# Patient Record
Sex: FEMALE | Race: WHITE | Employment: OTHER | URBAN - NONMETROPOLITAN AREA
[De-identification: names, ages, dates, MRNs, and addresses within clinical notes are randomized per-mention and may not be internally consistent; named-entity substitution may affect disease eponyms.]

---

## 2017-01-24 ENCOUNTER — TRANSFERRED RECORDS (OUTPATIENT)
Dept: HEALTH INFORMATION MANAGEMENT | Facility: OTHER | Age: 62
End: 2017-01-24

## 2018-01-05 ENCOUNTER — TRANSFERRED RECORDS (OUTPATIENT)
Dept: HEALTH INFORMATION MANAGEMENT | Facility: OTHER | Age: 63
End: 2018-01-05

## 2018-03-13 ENCOUNTER — TRANSFERRED RECORDS (OUTPATIENT)
Dept: HEALTH INFORMATION MANAGEMENT | Facility: OTHER | Age: 63
End: 2018-03-13

## 2018-05-31 ENCOUNTER — TRANSFERRED RECORDS (OUTPATIENT)
Dept: HEALTH INFORMATION MANAGEMENT | Facility: OTHER | Age: 63
End: 2018-05-31

## 2019-01-22 ENCOUNTER — OFFICE VISIT (OUTPATIENT)
Dept: FAMILY MEDICINE | Facility: OTHER | Age: 64
End: 2019-01-22
Attending: FAMILY MEDICINE
Payer: COMMERCIAL

## 2019-01-22 VITALS
RESPIRATION RATE: 20 BRPM | DIASTOLIC BLOOD PRESSURE: 101 MMHG | BODY MASS INDEX: 33.03 KG/M2 | SYSTOLIC BLOOD PRESSURE: 156 MMHG | WEIGHT: 186.4 LBS | HEART RATE: 80 BPM | HEIGHT: 63 IN | TEMPERATURE: 98 F

## 2019-01-22 DIAGNOSIS — G43.009 MIGRAINE WITHOUT AURA AND WITHOUT STATUS MIGRAINOSUS, NOT INTRACTABLE: ICD-10-CM

## 2019-01-22 DIAGNOSIS — Z12.31 ENCOUNTER FOR SCREENING MAMMOGRAM FOR BREAST CANCER: ICD-10-CM

## 2019-01-22 DIAGNOSIS — I10 HYPERTENSION, UNSPECIFIED TYPE: ICD-10-CM

## 2019-01-22 DIAGNOSIS — Z00.00 ROUTINE HISTORY AND PHYSICAL EXAMINATION OF ADULT: Primary | ICD-10-CM

## 2019-01-22 LAB
ANION GAP SERPL CALCULATED.3IONS-SCNC: 7 MMOL/L (ref 3–14)
BUN SERPL-MCNC: 11 MG/DL (ref 7–25)
CALCIUM SERPL-MCNC: 9.8 MG/DL (ref 8.6–10.3)
CHLORIDE SERPL-SCNC: 109 MMOL/L (ref 98–107)
CO2 SERPL-SCNC: 25 MMOL/L (ref 21–31)
CREAT SERPL-MCNC: 0.74 MG/DL (ref 0.6–1.2)
GFR SERPL CREATININE-BSD FRML MDRD: 79 ML/MIN/{1.73_M2}
GLUCOSE SERPL-MCNC: 90 MG/DL (ref 70–105)
POTASSIUM SERPL-SCNC: 3.9 MMOL/L (ref 3.5–5.1)
SODIUM SERPL-SCNC: 141 MMOL/L (ref 134–144)
TSH SERPL DL<=0.05 MIU/L-ACNC: 3.24 IU/ML (ref 0.34–5.6)
VIT B12 SERPL-MCNC: 470 PG/ML (ref 180–914)

## 2019-01-22 PROCEDURE — 82607 VITAMIN B-12: CPT | Performed by: FAMILY MEDICINE

## 2019-01-22 PROCEDURE — 99396 PREV VISIT EST AGE 40-64: CPT | Performed by: FAMILY MEDICINE

## 2019-01-22 PROCEDURE — 84443 ASSAY THYROID STIM HORMONE: CPT | Performed by: FAMILY MEDICINE

## 2019-01-22 PROCEDURE — 80048 BASIC METABOLIC PNL TOTAL CA: CPT | Performed by: FAMILY MEDICINE

## 2019-01-22 PROCEDURE — 36415 COLL VENOUS BLD VENIPUNCTURE: CPT | Performed by: FAMILY MEDICINE

## 2019-01-22 RX ORDER — SUMATRIPTAN 100 MG/1
100 TABLET, FILM COATED ORAL DAILY PRN
Qty: 9 TABLET | Refills: 11 | Status: SHIPPED | OUTPATIENT
Start: 2019-01-22 | End: 2020-02-06

## 2019-01-22 RX ORDER — TOPIRAMATE 25 MG/1
25 TABLET, FILM COATED ORAL 2 TIMES DAILY
Qty: 180 TABLET | Refills: 3 | Status: SHIPPED | OUTPATIENT
Start: 2019-01-22 | End: 2020-02-06

## 2019-01-22 RX ORDER — LISINOPRIL 10 MG/1
10 TABLET ORAL DAILY
COMMUNITY
Start: 2019-01-08 | End: 2019-01-22

## 2019-01-22 RX ORDER — ESTRADIOL 0.1 MG/G
2 CREAM VAGINAL PRN
Refills: 1 | COMMUNITY
Start: 2018-03-29

## 2019-01-22 RX ORDER — SUMATRIPTAN 100 MG/1
100 TABLET, FILM COATED ORAL DAILY PRN
Refills: 11 | COMMUNITY
Start: 2018-12-17 | End: 2019-01-22

## 2019-01-22 RX ORDER — BACLOFEN 20 MG
2 TABLET ORAL DAILY
COMMUNITY
Start: 2011-08-26

## 2019-01-22 RX ORDER — TOPIRAMATE 25 MG/1
25 TABLET, FILM COATED ORAL DAILY
COMMUNITY
Start: 2019-01-08 | End: 2019-01-22

## 2019-01-22 RX ORDER — LISINOPRIL 20 MG/1
20 TABLET ORAL DAILY
Qty: 90 TABLET | Refills: 3 | Status: SHIPPED | OUTPATIENT
Start: 2019-01-22 | End: 2019-08-08

## 2019-01-22 SDOH — HEALTH STABILITY: MENTAL HEALTH: HOW OFTEN DO YOU HAVE 6 OR MORE DRINKS ON ONE OCCASION?: NEVER

## 2019-01-22 SDOH — HEALTH STABILITY: MENTAL HEALTH: HOW OFTEN DO YOU HAVE A DRINK CONTAINING ALCOHOL?: NEVER

## 2019-01-22 ASSESSMENT — MIFFLIN-ST. JEOR: SCORE: 1361.69

## 2019-01-22 ASSESSMENT — PAIN SCALES - GENERAL: PAINLEVEL: MILD PAIN (3)

## 2019-01-22 NOTE — PROGRESS NOTES
Female Physical Note    Concerns today:   Nursing Notes:   Duglas Bernard LPN  2019  1:23 PM  Signed  Patient presents to clinic today for an annual physical.     No LMP recorded.  Medication Reconciliation: complete    Duglas Marco AntonioKILO  2019 12:54 PM    63-year-old female presents to [facility] care and for annual physical.  She is also here for follow-up of chronic medical problems.    Migraine headaches have been present for the most of her life.  She reports increasing frequency over the past few years.  Previously seen reports a CT scan was performed that was normal within the last 5 years.  She is tried a variety of medications for prevention.  Currently on Topamax 25 mg daily.  With higher doses she developed some twitching of her eyes.  She also did not tolerate beta-blockers.  Still having HAs, 1-2 per week  Migraine HA, on topamax for prevention, triggers gluten and chocolate, uses  Imitrex, did not tolerate higher dosage of topamax, also tried beta-blocker, not effective      Mid back pain, x 3-4 months, constant, NSAIDs helps some          ROS:  CONSTITUTIONAL: no fatigue, no unexpected change in weight  SKIN: no worrisome rashes, no worrisome moles, no worrisome lesions  EYES: no acute vision problems or changes  ENT: no ear problems, no mouth problems, no throat problems  RESP: no significant cough, no shortness of breath  CV: no chest pain, no palpitations, no new or worsening peripheral edema  GI: no nausea, no vomiting, no constipation, no diarrhea  NEURO: no weakness, no dizziness, no syncope, no headaches  HEME: no easy bruising, no bleeding problems  PSYCHIATRIC: NEGATIVE for changes in mood or affect    Sexually Active: Yes  Sexual concerns: vaginal dryness   Contraception:not needed   P: 4  Menarche:  No LMP recorded. Menopausal since: age 50  STD History: Neg  Last Pap Smear Date:  normal  Abnormal Pap History: None    There is no problem list on file for this patient.      Current  Outpatient Medications   Medication Sig Dispense Refill     estradiol (ESTRACE) 0.1 MG/GM vaginal cream Place 2 g vaginally as needed  1     lisinopril (PRINIVIL/ZESTRIL) 10 MG tablet Take 10 mg by mouth daily       Magnesium Oxide 500 MG TABS Take 2 tablets by mouth daily       SUMAtriptan (IMITREX) 100 MG tablet Take 100 mg by mouth daily as needed Use with migraines  11     topiramate (TOPAMAX) 25 MG tablet Take 25 mg by mouth daily       vitamin D3 (CHOLECALCIFEROL) 1000 units (25 mcg) tablet Take 2 tablets by mouth daily         History reviewed. No pertinent past medical history.         Problem List Medication List and Allergy List were reviewed.    Patient is new to clinic    Social History     Tobacco Use     Smoking status: Never Smoker     Smokeless tobacco: Never Used   Substance Use Topics     Alcohol use: No     Frequency: Never     Binge frequency: Never       Children ? yes     Has anyone hurt you physically, for example by pushing, hitting, slapping or kicking you or forcing you to have sex? Denies  Do you feel threatened or controlled by a partner, ex-partner or anyone in your life? Denies    RISK BEHAVIORS AND HEALTHY HABITS:  Tobacco Use/Smoking: None  Illicit Drug Use: None  Do you use alcohol? Yes  Number of drinking days a week 2  Diet (5-7 servings of fruits/veg daily): Yes   Exercise (30 min accumulated most days):Yes  Dental Care: Yes   Calcium 1500 mg/d:  Yes  Seat Belt Use: Yes     Pap/HPV cotest every 5 years for women 30-65   Testing not indicated   Breast CA Screening (>39 yo or 10 y before 1st degree relative diagnosis): Recommended and patient accepted testing.  CV Risk based on Pooled Cohort Risk:   Pooled Cohort Risk Calculator    Immunization History   Administered Date(s) Administered     [id number] Novel Flu P-free 11/04/2009     Influenza (IIV3) PF 10/29/2008, 10/13/2009, 10/12/2010, 09/26/2011, 09/12/2012, 10/06/2014     Influenza Vaccine IM 3yrs+ 4 Valent IIV4 10/14/2015,  "10/24/2016, 09/13/2017, 10/12/2018     TDAP Vaccine (Adacel) 07/22/2009     Td (Adult), Adsorbed 01/01/1999     Zoster vaccine, live 10/02/2012    Reviewed Immunization Record Today    EXAMINATION:   BP (!) 156/101 (BP Location: Right arm, Patient Position: Sitting, Cuff Size: Adult Regular)   Pulse 80   Temp 98  F (36.7  C) (Tympanic)   Resp 20   Ht 1.588 m (5' 2.5\")   Wt 84.6 kg (186 lb 6.4 oz)   Breastfeeding? No   BMI 33.55 kg/m    GENERAL: healthy, alert and no distress  EYES: Eyes grossly normal to inspection, extraocular movements - intact, and PERRL  HENT: ear canals- normal; TMs- normal; Nose- normal; Mouth- no ulcers, no lesions  NECK: no tenderness, no adenopathy, no asymmetry, no masses, no stiffness; thyroid- normal to palpation  RESP: lungs clear to auscultation - no rales, no rhonchi, no wheezes  BREAST: no masses, no tenderness, no nipple discharge, no palpable axillary masses or adenopathy  CV: regular rates and rhythm, normal S1 S2, no S3 or S4 and no murmur, no click or rub -  ABDOMEN: soft, no tenderness, no  hepatosplenomegaly, no masses, normal bowel sounds  MS: extremities- no gross deformities noted, no edema  SKIN: no suspicious lesions, no rashes  NEURO: strength and tone- normal, sensory exam- grossly normal, mentation- intact, speech- normal, reflexes- symmetric  BACK: no CVA tenderness, no paralumbar tenderness  PSYCH: Alert and oriented times 3; speech- coherent , normal rate and volume; able to articulate logical thoughts, able to abstract reason, no tangential thoughts, no hallucinations or delusions, affect- normal  LYMPHATICS: ant. cervical- normal, post. cervical- normal, axillary- normal, supraclavicular- normal, inguinal- normal    ASSESSMENT:    1. Routine history and physical examination of adult    2. Hypertension, unspecified type    3. Migraine without aura and without status migrainosus, not intractable    4. Encounter for screening mammogram for breast cancer  "       PLAN:  Discussed options for migraine management.  Agree with focusing on preventative measures.  Patient was intolerant to beta-blockers.  She is currently on subtherapeutic dose of Topamax.  Will increase to 25 mg twice daily.  Monitor for improvement.  May also consider trial of diltiazem or verapamil.    Needs improved blood pressure control.  Will increase lisinopril to 20 mg daily.  Patient is counseled on importance of regular self breast exams and mammograms every 1-2 years starting at age 40. Patient will proceed with pap smears every 3-5 years until age 65. Discussed importance of calcium and vitamin D supplementation and osteoporosis screening. Immunizations are updated based on CDC recommendations and patients desire. Reviewed importance of sunscreen, limit sun exposure and monitoring for changing moles with ABCDEs. Recommend seatbelt use and helmets with biking, skiing and ATV/Snowmobile use.      Patient Instructions   Increase topamax to twice daily  Increase lisinopril to 20 mg daily, blood pressure goal < 140/90  Check labs today  Schedule mammogram      Francine Osuna MD

## 2019-01-22 NOTE — LETTER
January 31, 2019      Ines Joshi  [address] [address] [id number]        Dear ,    We are writing to inform you of your test results.    Your test results fall within the expected range(s) or remain unchanged from previous results.  Please continue with current treatment plan.    Resulted Orders   Basic metabolic panel   Result Value Ref Range    Sodium 141 134 - 144 mmol/L    Potassium 3.9 3.5 - 5.1 mmol/L    Chloride 109 (H) 98 - 107 mmol/L    Carbon Dioxide 25 21 - 31 mmol/L    Anion Gap 7 3 - 14 mmol/L    Glucose 90 70 - 105 mg/dL    Urea Nitrogen 11 7 - 25 mg/dL    Creatinine 0.74 0.60 - 1.20 mg/dL    GFR Estimate 79 >60 mL/min/[1.73_m2]    GFR Estimate If Black >90 >60 mL/min/[1.73_m2]    Calcium 9.8 8.6 - 10.3 mg/dL   Vitamin B12   Result Value Ref Range    Vitamin B12 470 180 - 914 pg/mL   TSH   Result Value Ref Range    Thyrotropin 3.24 0.34 - 5.60 IU/mL       If you have any questions or concerns, please call the clinic at the number listed above.       Sincerely,        Francine Osuna MD

## 2019-01-22 NOTE — NURSING NOTE
Patient presents to clinic today for an annual physical.     No LMP recorded.  Medication Reconciliation: complete    Duglas Bernard LPN  1/22/2019 12:54 PM

## 2019-01-22 NOTE — PATIENT INSTRUCTIONS
Increase topamax to twice daily  Increase lisinopril to 20 mg daily, blood pressure goal < 140/90  Check labs today  Schedule mammogram

## 2019-01-29 ENCOUNTER — HOSPITAL ENCOUNTER (OUTPATIENT)
Dept: MAMMOGRAPHY | Facility: OTHER | Age: 64
Discharge: HOME | End: 2019-01-29
Attending: FAMILY MEDICINE | Admitting: FAMILY MEDICINE
Payer: COMMERCIAL

## 2019-01-29 DIAGNOSIS — Z12.31 ENCOUNTER FOR SCREENING MAMMOGRAM FOR BREAST CANCER: ICD-10-CM

## 2019-01-29 PROCEDURE — 77067 SCR MAMMO BI INCL CAD: CPT

## 2019-02-15 ENCOUNTER — HEALTH MAINTENANCE LETTER (OUTPATIENT)
Age: 64
End: 2019-02-15

## 2019-08-06 ENCOUNTER — OFFICE VISIT (OUTPATIENT)
Dept: FAMILY MEDICINE | Facility: OTHER | Age: 64
End: 2019-08-06
Attending: FAMILY MEDICINE
Payer: COMMERCIAL

## 2019-08-06 VITALS
DIASTOLIC BLOOD PRESSURE: 88 MMHG | BODY MASS INDEX: 32.69 KG/M2 | RESPIRATION RATE: 12 BRPM | HEART RATE: 68 BPM | TEMPERATURE: 99 F | OXYGEN SATURATION: 97 % | SYSTOLIC BLOOD PRESSURE: 136 MMHG | WEIGHT: 181.6 LBS

## 2019-08-06 DIAGNOSIS — G43.019 INTRACTABLE MIGRAINE WITHOUT AURA AND WITHOUT STATUS MIGRAINOSUS: ICD-10-CM

## 2019-08-06 DIAGNOSIS — H91.93 BILATERAL HEARING LOSS, UNSPECIFIED HEARING LOSS TYPE: ICD-10-CM

## 2019-08-06 DIAGNOSIS — I10 HYPERTENSION, UNSPECIFIED TYPE: Primary | ICD-10-CM

## 2019-08-06 PROCEDURE — 99214 OFFICE O/P EST MOD 30 MIN: CPT | Performed by: FAMILY MEDICINE

## 2019-08-06 RX ORDER — LISINOPRIL AND HYDROCHLOROTHIAZIDE 12.5; 2 MG/1; MG/1
1 TABLET ORAL DAILY
Qty: 90 TABLET | Refills: 3 | Status: SHIPPED | OUTPATIENT
Start: 2019-08-06

## 2019-08-06 ASSESSMENT — PAIN SCALES - GENERAL: PAINLEVEL: MODERATE PAIN (4)

## 2019-08-06 NOTE — NURSING NOTE
Patient is here to have ears checked to make sure they are clean, states is having hearing tested next week.  Rehana Haney LPN .............8/6/2019     11:18 AM      No LMP recorded (lmp unknown). Patient is postmenopausal.  Medication Reconciliation: complete    Rehana Haney LPN  8/6/2019 11:23 AM

## 2019-08-06 NOTE — PROGRESS NOTES
SUBJECTIVE:   Ines Joshi is a 64 year old female who presents to clinic today for the following health issues:  Nursing Notes:   Rehana Haney LPN  8/6/2019 11:32 AM  Signed  Patient is here to have ears checked to make sure they are clean, states is having hearing tested next week.  Rehana Haney LPN .............8/6/2019     11:18 AM      No LMP recorded (lmp unknown). Patient is postmenopausal.  Medication Reconciliation: complete    Rehana Haney LPN  8/6/2019 11:23 AM    HPI    63 yo female presents for ear check and follow-up of hypertension.  Patient reports of bilateral hearing loss, gradual onset over the past few years.  She is leaning towards getting hearing aids.  She was told she needed having ear exam prior to being fitted.  She denies tinnitus.  Reports decreased hearing with high frequencies and in crowded rooms.    Blood pressures been under fair control.Patient monitors at home, blood pressures have been 150/80, rare < 130/90    She also has a history of migraine headaches.  She takes Topamax for prevention and Imitrex for breakthrough  There are no active problems to display for this patient.    Past Medical History:   Diagnosis Date     HTN (hypertension)      Migraine headache without aura       Current Outpatient Medications   Medication Sig Dispense Refill     estradiol (ESTRACE) 0.1 MG/GM vaginal cream Place 2 g vaginally as needed  1     lisinopril-hydrochlorothiazide (PRINZIDE/ZESTORETIC) 20-12.5 MG tablet Take 1 tablet by mouth daily 90 tablet 3     Magnesium Oxide 500 MG TABS Take 2 tablets by mouth daily       SUMAtriptan (IMITREX) 100 MG tablet Take 1 tablet (100 mg) by mouth daily as needed Use with migraines 9 tablet 11     topiramate (TOPAMAX) 25 MG tablet Take 1 tablet (25 mg) by mouth 2 times daily 180 tablet 3     vitamin D3 (CHOLECALCIFEROL) 1000 units (25 mcg) tablet Take 2 tablets by mouth daily         Review of Systems   HENT: Positive for hearing loss.  Negative for ear discharge, ear pain and tinnitus.    Respiratory: Negative for shortness of breath.    Cardiovascular: Negative for chest pain.   Neurological: Positive for headaches. Negative for tremors, seizures, speech difficulty, weakness, light-headedness, numbness and paresthesias.        OBJECTIVE:     /88 (BP Location: Right arm, Patient Position: Sitting, Cuff Size: Adult Large)   Pulse 68   Temp 99  F (37.2  C) (Tympanic)   Resp 12   Wt 82.4 kg (181 lb 9.6 oz)   LMP  (LMP Unknown)   SpO2 97%   Breastfeeding? No   BMI 32.69 kg/m     Body mass index is 32.69 kg/m .  Physical Exam   Constitutional: She appears well-developed.   HENT:   Right Ear: External ear normal.   Left Ear: External ear normal.   Nose: Nose normal.   Mouth/Throat: Oropharynx is clear and moist. No oropharyngeal exudate.   Neck: No thyromegaly present.   Cardiovascular: Normal rate and regular rhythm.   Pulmonary/Chest: Effort normal.   Lymphadenopathy:     She has no cervical adenopathy.       Diagnostic Test Results:  none     ASSESSMENT/PLAN:         1. Hypertension, unspecified type  Blood pressures are not at ideal goal. Will switch to lisinopril HCTZ 1 tab daily.  Continue monitoring at home and report any changes or side effects.  Will need chemistry panel in 6 to 8 weeks.  - lisinopril-hydrochlorothiazide (PRINZIDE/ZESTORETIC) 20-12.5 MG tablet; Take 1 tablet by mouth daily  Dispense: 90 tablet; Refill: 3    2. Intractable migraine without aura and without status migrainosus  Stable with current medications including Topamax and Imitrex.  Reinforced trigger avoidance.    3. Bilateral hearing loss, unspecified hearing loss type  Ear exam was normal today.  May pursue hearing aids        Francine Osuna MD  [facility]

## 2019-08-08 ASSESSMENT — ENCOUNTER SYMPTOMS
PARESTHESIAS: 0
LIGHT-HEADEDNESS: 0
SEIZURES: 0
SPEECH DIFFICULTY: 0
SHORTNESS OF BREATH: 0
HEADACHES: 1
WEAKNESS: 0
NUMBNESS: 0
TREMORS: 0

## 2020-02-05 DIAGNOSIS — G43.009 MIGRAINE WITHOUT AURA AND WITHOUT STATUS MIGRAINOSUS, NOT INTRACTABLE: ICD-10-CM

## 2020-02-06 RX ORDER — TOPIRAMATE 25 MG/1
TABLET, FILM COATED ORAL
Qty: 180 TABLET | Refills: 0 | Status: SHIPPED | OUTPATIENT
Start: 2020-02-06

## 2020-02-06 RX ORDER — SUMATRIPTAN 100 MG/1
TABLET, FILM COATED ORAL
Qty: 10 TABLET | Refills: 0 | Status: SHIPPED | OUTPATIENT
Start: 2020-02-06

## 2020-02-06 NOTE — TELEPHONE ENCOUNTER
"[facility] sent Rx request for the following:      Topiramate 25 MG Oral Tablet  Sig: TAKE 1 TABLET BY MOUTH TWICE DAILY     Last Prescription Date:   1/22/2019  Last Fill Qty/Refills:         180, R-3    Last Office Visit:              8/6/2019  Future Office visit:           none       SUMAtriptan Succinate 100 MG Oral Tablet  Sig: TAKE 1 TABLET BY MOUTH DAILY AS NEEDED FOR  MIGRAINES     Last Prescription Date:   1/22/2019  Last Fill Qty/Refills:         9, R-11        Requested Prescriptions   Pending Prescriptions Disp Refills     topiramate (TOPAMAX) 25 MG tablet [Pharmacy Med Name: Topiramate 25 MG Oral Tablet]  0     Sig: TAKE 1 TABLET BY MOUTH TWICE DAILY       Anti-Seizure Meds Protocol  Failed - 2/5/2020 12:38 PM        Failed - Review Authorizing provider's last note.      Refer to last progress notes: confirm request is for original authorizing provider (cannot be through other providers).          Failed - Normal CBC on file in past 26 months     No lab results found.              Failed - Normal ALT or AST on file in past 26 months     No lab results found.  No lab results found.          Failed - Normal platelet count on file in past 26 months     No lab results found.            Passed - Recent (12 mo) or future (30 days) visit within the authorizing provider's specialty     Patient has had an office visit with the authorizing provider or a provider within the authorizing providers department within the previous 12 mos or has a future within next 30 days. See \"Patient Info\" tab in inbasket, or \"Choose Columns\" in Meds & Orders section of the refill encounter.              Passed - Medication is active on med list        Passed - No active pregnancy on record        Passed - No positive pregnancy test in last 12 months        SUMAtriptan (IMITREX) 100 MG tablet [Pharmacy Med Name: SUMAtriptan Succinate 100 MG Oral Tablet]  0     Sig: TAKE 1 TABLET BY MOUTH DAILY AS NEEDED FOR  MIGRAINES       " "Serotonin Agonists Failed - 2/5/2020 12:38 PM        Failed - Serotonin Agonist request needs review.     Please review patient's record. If patient has had 8 or more treatments in the past month, please forward to provider.          Passed - Blood pressure under 140/90 in past 12 months     BP Readings from Last 3 Encounters:   08/06/19 136/88   01/22/19 (!) 156/101                 Passed - Recent (12 mo) or future (30 days) visit within the authorizing provider's specialty     Patient has had an office visit with the authorizing provider or a provider within the authorizing providers department within the previous 12 mos or has a future within next 30 days. See \"Patient Info\" tab in inbasket, or \"Choose Columns\" in Meds & Orders section of the refill encounter.              Passed - Medication is active on med list        Passed - Patient is age 18 or older        Passed - No active pregnancy on record        Passed - No positive pregnancy test in past 12 months        Unable to complete prescription refill per RN Medication Refill Policy.................... Epifanio Cunningham RN ....................  2/6/2020   11:08 AM                  "

## 2020-02-07 ENCOUNTER — MYC REFILL (OUTPATIENT)
Dept: FAMILY MEDICINE | Facility: OTHER | Age: 65
End: 2020-02-07

## 2020-02-07 DIAGNOSIS — G43.009 MIGRAINE WITHOUT AURA AND WITHOUT STATUS MIGRAINOSUS, NOT INTRACTABLE: ICD-10-CM

## 2020-02-07 DIAGNOSIS — I10 HYPERTENSION, UNSPECIFIED TYPE: ICD-10-CM

## 2020-02-07 RX ORDER — LISINOPRIL AND HYDROCHLOROTHIAZIDE 12.5; 2 MG/1; MG/1
1 TABLET ORAL DAILY
Qty: 90 TABLET | Refills: 3 | Status: CANCELLED | OUTPATIENT
Start: 2020-02-07

## 2020-02-07 RX ORDER — TOPIRAMATE 25 MG/1
25 TABLET, FILM COATED ORAL 2 TIMES DAILY
Qty: 180 TABLET | Refills: 0 | Status: CANCELLED | OUTPATIENT
Start: 2020-02-07

## 2020-02-07 RX ORDER — SUMATRIPTAN 100 MG/1
TABLET, FILM COATED ORAL
Qty: 10 TABLET | Refills: 0 | Status: CANCELLED | OUTPATIENT
Start: 2020-02-07

## 2020-02-10 NOTE — TELEPHONE ENCOUNTER
" Disp Refills Start End ANJELICA   SUMAtriptan (IMITREX) 100 MG tablet 10 tablet 0 2/6/2020  No   Sig: TAKE 1 TABLET BY MOUTH DAILY AS NEEDED FOR  MIGRAINES      Disp Refills Start End ANJELICA   topiramate (TOPAMAX) 25 MG tablet 180 tablet 0 2/6/2020  No   Sig: TAKE 1 TABLET BY MOUTH TWICE DAILY      Disp Refills Start End ANJELICA   lisinopril-hydrochlorothiazide (PRINZIDE/ZESTORETIC) 20-12.5 MG tablet 90 tablet 3 8/6/2019  --   Sig - Route: Take 1 tablet by mouth daily - Oral       LOV: 8/6/2019  Future Office visit: No future appointment scheduled at this time.      Routing refill request to provider for review/approval because:  Failed protocol  Denying request. Called pharmacy and they have them ready and waiting for .    Requested Prescriptions   Pending Prescriptions Disp Refills     lisinopril-hydrochlorothiazide (PRINZIDE/ZESTORETIC) 20-12.5 MG tablet 90 tablet 3     Sig: Take 1 tablet by mouth daily       Diuretics (Including Combos) Protocol Failed - 2/10/2020 11:38 AM        Failed - Normal serum creatinine on file in past 12 months     Recent Labs   Lab Test 01/22/19  1359   CR 0.74              Failed - Normal serum potassium on file in past 12 months     Recent Labs   Lab Test 01/22/19  1359   POTASSIUM 3.9                    Failed - Normal serum sodium on file in past 12 months     Recent Labs   Lab Test 01/22/19  1359                 Passed - Blood pressure under 140/90 in past 12 months     BP Readings from Last 3 Encounters:   08/06/19 136/88   01/22/19 (!) 156/101                 Passed - Recent (12 mo) or future (30 days) visit within the authorizing provider's specialty     Patient has had an office visit with the authorizing provider or a provider within the authorizing providers department within the previous 12 mos or has a future within next 30 days. See \"Patient Info\" tab in inbasket, or \"Choose Columns\" in Meds & Orders section of the refill encounter.              Passed - Medication is " "active on med list        Passed - Patient is age 18 or older        Passed - No active pregancy on record        Passed - No positive pregnancy test in past 12 months        topiramate (TOPAMAX) 25 MG tablet 180 tablet 0     Sig: Take 1 tablet (25 mg) by mouth 2 times daily       Anti-Seizure Meds Protocol  Failed - 2/10/2020 11:38 AM        Failed - Review Authorizing provider's last note.      Refer to last progress notes: confirm request is for original authorizing provider (cannot be through other providers).          Failed - Normal CBC on file in past 26 months     No lab results found.              Failed - Normal ALT or AST on file in past 26 months     No lab results found.  No lab results found.          Failed - Normal platelet count on file in past 26 months     No lab results found.            Passed - Recent (12 mo) or future (30 days) visit within the authorizing provider's specialty     Patient has had an office visit with the authorizing provider or a provider within the authorizing providers department within the previous 12 mos or has a future within next 30 days. See \"Patient Info\" tab in inbasket, or \"Choose Columns\" in Meds & Orders section of the refill encounter.              Passed - Medication is active on med list        Passed - No active pregnancy on record        Passed - No positive pregnancy test in last 12 months        SUMAtriptan (IMITREX) 100 MG tablet 10 tablet 0       Serotonin Agonists Failed - 2/10/2020 11:38 AM        Failed - Serotonin Agonist request needs review.     Please review patient's record. If patient has had 8 or more treatments in the past month, please forward to provider.          Passed - Blood pressure under 140/90 in past 12 months     BP Readings from Last 3 Encounters:   08/06/19 136/88   01/22/19 (!) 156/101                 Passed - Recent (12 mo) or future (30 days) visit within the authorizing provider's specialty     Patient has had an office visit " "with the authorizing provider or a provider within the authorizing providers department within the previous 12 mos or has a future within next 30 days. See \"Patient Info\" tab in inbasket, or \"Choose Columns\" in Meds & Orders section of the refill encounter.              Passed - Medication is active on med list        Passed - Patient is age 18 or older        Passed - No active pregnancy on record        Passed - No positive pregnancy test in past 12 months      Unable to complete prescription refill per RN Medication Refill Policy.................... Chiqui Manjarrez RN ....................  2/10/2020   2:16 PM        "

## 2020-02-11 DIAGNOSIS — G43.009 MIGRAINE WITHOUT AURA AND WITHOUT STATUS MIGRAINOSUS, NOT INTRACTABLE: ICD-10-CM

## 2020-02-12 RX ORDER — TOPIRAMATE 25 MG/1
TABLET, FILM COATED ORAL
Qty: 180 TABLET | OUTPATIENT
Start: 2020-02-12

## 2020-02-12 RX ORDER — SUMATRIPTAN 100 MG/1
TABLET, FILM COATED ORAL
Qty: 10 TABLET | OUTPATIENT
Start: 2020-02-12

## 2020-02-12 NOTE — TELEPHONE ENCOUNTER
" Disp Refills Start End ANJELICA   topiramate (TOPAMAX) 25 MG tablet 180 tablet 0 2/6/2020  No   Sig: TAKE 1 TABLET BY MOUTH TWICE DAILY      Disp Refills Start End ANJELICA   SUMAtriptan (IMITREX) 100 MG tablet 10 tablet 0 2/6/2020  No   Sig: TAKE 1 TABLET BY MOUTH DAILY AS NEEDED FOR  MIGRAINES       LOV: 8/6/2019  Future Office visit: No future appointment scheduled at this time.      Denying request. Spoke with the Pharmacy and they agreed it doesn't need to be refilled at this time, as both were just filled on 2/6/2020      Requested Prescriptions   Pending Prescriptions Disp Refills     SUMAtriptan (IMITREX) 100 MG tablet [Pharmacy Med Name: SUMAtriptan Succinate 100 MG Oral Tablet]  0     Sig: TAKE 1 TABLET BY MOUTH DAILY AS NEEDED FOR  MIGRAINES       Serotonin Agonists Failed - 2/11/2020  1:13 PM        Failed - Serotonin Agonist request needs review.     Please review patient's record. If patient has had 8 or more treatments in the past month, please forward to provider.          Passed - Blood pressure under 140/90 in past 12 months     BP Readings from Last 3 Encounters:   08/06/19 136/88   01/22/19 (!) 156/101                 Passed - Recent (12 mo) or future (30 days) visit within the authorizing provider's specialty     Patient has had an office visit with the authorizing provider or a provider within the authorizing providers department within the previous 12 mos or has a future within next 30 days. See \"Patient Info\" tab in inbasket, or \"Choose Columns\" in Meds & Orders section of the refill encounter.              Passed - Medication is active on med list        Passed - Patient is age 18 or older        Passed - No active pregnancy on record        Passed - No positive pregnancy test in past 12 months        topiramate (TOPAMAX) 25 MG tablet [Pharmacy Med Name: Topiramate 25 MG Oral Tablet]  0     Sig: TAKE 1 TABLET BY MOUTH TWICE DAILY       Anti-Seizure Meds Protocol  Failed - 2/11/2020  1:13 PM        " "Failed - Review Authorizing provider's last note.      Refer to last progress notes: confirm request is for original authorizing provider (cannot be through other providers).          Failed - Normal CBC on file in past 26 months     No lab results found.              Failed - Normal ALT or AST on file in past 26 months     No lab results found.  No lab results found.          Failed - Normal platelet count on file in past 26 months     No lab results found.            Passed - Recent (12 mo) or future (30 days) visit within the authorizing provider's specialty     Patient has had an office visit with the authorizing provider or a provider within the authorizing providers department within the previous 12 mos or has a future within next 30 days. See \"Patient Info\" tab in inbasket, or \"Choose Columns\" in Meds & Orders section of the refill encounter.              Passed - Medication is active on med list        Passed - No active pregnancy on record        Passed - No positive pregnancy test in last 12 months      Unable to complete prescription refill per RN Medication Refill Policy.................... Chiqui Manjarrez RN ....................  2/12/2020   9:44 AM        "

## 2020-03-11 ENCOUNTER — HEALTH MAINTENANCE LETTER (OUTPATIENT)
Age: 65
End: 2020-03-11

## 2020-03-16 ENCOUNTER — TRANSFERRED RECORDS (OUTPATIENT)
Dept: HEALTH INFORMATION MANAGEMENT | Facility: OTHER | Age: 65
End: 2020-03-16

## 2021-01-03 ENCOUNTER — HEALTH MAINTENANCE LETTER (OUTPATIENT)
Age: 66
End: 2021-01-03

## 2021-04-25 ENCOUNTER — HEALTH MAINTENANCE LETTER (OUTPATIENT)
Age: 66
End: 2021-04-25

## 2021-10-09 ENCOUNTER — HEALTH MAINTENANCE LETTER (OUTPATIENT)
Age: 66
End: 2021-10-09

## 2022-01-29 ENCOUNTER — HEALTH MAINTENANCE LETTER (OUTPATIENT)
Age: 67
End: 2022-01-29

## 2022-09-17 ENCOUNTER — HEALTH MAINTENANCE LETTER (OUTPATIENT)
Age: 67
End: 2022-09-17

## 2023-05-06 ENCOUNTER — HEALTH MAINTENANCE LETTER (OUTPATIENT)
Age: 68
End: 2023-05-06